# Patient Record
Sex: MALE | Race: ASIAN | NOT HISPANIC OR LATINO | URBAN - METROPOLITAN AREA
[De-identification: names, ages, dates, MRNs, and addresses within clinical notes are randomized per-mention and may not be internally consistent; named-entity substitution may affect disease eponyms.]

---

## 2019-12-17 ENCOUNTER — OFFICE VISIT (OUTPATIENT)
Dept: INTERNAL MEDICINE CLINIC | Facility: CLINIC | Age: 34
End: 2019-12-17
Payer: COMMERCIAL

## 2019-12-17 VITALS
HEART RATE: 100 BPM | SYSTOLIC BLOOD PRESSURE: 126 MMHG | RESPIRATION RATE: 16 BRPM | TEMPERATURE: 98.4 F | BODY MASS INDEX: 19.68 KG/M2 | OXYGEN SATURATION: 98 % | DIASTOLIC BLOOD PRESSURE: 86 MMHG | HEIGHT: 67 IN | WEIGHT: 125.4 LBS

## 2019-12-17 DIAGNOSIS — Z13.1 SCREENING FOR DIABETES MELLITUS: ICD-10-CM

## 2019-12-17 DIAGNOSIS — Z13.220 SCREENING FOR HYPERLIPIDEMIA: ICD-10-CM

## 2019-12-17 DIAGNOSIS — L50.9 URTICARIA: Primary | ICD-10-CM

## 2019-12-17 DIAGNOSIS — Z13.29 SCREENING FOR THYROID DISORDER: ICD-10-CM

## 2019-12-17 PROCEDURE — 3008F BODY MASS INDEX DOCD: CPT | Performed by: INTERNAL MEDICINE

## 2019-12-17 PROCEDURE — 99203 OFFICE O/P NEW LOW 30 MIN: CPT | Performed by: INTERNAL MEDICINE

## 2019-12-17 RX ORDER — TRIAMCINOLONE ACETONIDE 5 MG/G
CREAM TOPICAL 3 TIMES DAILY
Qty: 30 G | Refills: 1 | Status: SHIPPED | OUTPATIENT
Start: 2019-12-17

## 2019-12-17 NOTE — PROGRESS NOTES
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  -lesions present 1 month ago when he was in [place] working  -white linear streaks noted without puritis or tenderness however reports spreading without improvement  -has some mild eczema noted within webbing of hand  -will start on triamcinolone  -refer to dermatology    #Family History HLD  -noted in father  -will obtain lipids    #Health Maintenance  -routine labs and return to care 1 year  -flu vaccine 2019    No problem-specific Assessment & Plan notes found for this encounter  Diagnoses and all orders for this visit:    Urticaria  -     triamcinolone (KENALOG) 0 5 % cream; Apply topically 3 (three) times a day  -     CBC and differential  -     Lipid Panel with Direct LDL reflex  -     Hemoglobin A1C  -     TSH, 3rd generation with Free T4 reflex  -     Comprehensive metabolic panel  -     Ambulatory referral to Dermatology; Future    Screening for diabetes mellitus  -     CBC and differential  -     Lipid Panel with Direct LDL reflex  -     Hemoglobin A1C  -     TSH, 3rd generation with Free T4 reflex  -     Comprehensive metabolic panel    Screening for hyperlipidemia  -     CBC and differential  -     Lipid Panel with Direct LDL reflex  -     Hemoglobin A1C  -     TSH, 3rd generation with Free T4 reflex  -     Comprehensive metabolic panel    Screening for thyroid disorder  -     CBC and differential  -     Lipid Panel with Direct LDL reflex  -     Hemoglobin A1C  -     TSH, 3rd generation with Free T4 reflex  -     Comprehensive metabolic panel    Other orders  -     Cancel: HIV 1/2 AG-AB combo; Future            Current Outpatient Medications:     triamcinolone (KENALOG) 0 5 % cream, Apply topically 3 (three) times a day, Disp: 30 g, Rfl: 1    Subjective:      Patient ID: Luis Hillman is a 29 y o  male      HPI     Patient presents as a new patient visit  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  He states that last month he was working in [place] when he started noticing white linear lesions on his hands without any pruritus or tenderness  He states that over the past month that has been progressively getting worse  He has not done anything to alleviate this  He denies any recent changes in soaps or detergents  He reports that 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  On his hands we also noted that he had some regions of eczema between the webbing of his fingers and as result we will start him on a topical steroid  Informed him that his lesions may be likely related to aortic area however it is unclear  We will start him on the triamcinolone to see if this clears out and if it does not then he will need to see Dermatology  Patient denies any pets at home  He states that he goes hiking often however does not have any other hobbies  Patient denies any past medical is history or surgeries or hospitalizations  He is not currently on any medications and does not have any allergies  He denies any smoking, alcohol, drug use  Family history is positive for father with hyperlipidemia  Patient is up-to-date on his flu vaccine  We will obtain routine labs and he will return to care in 1 year    The following portions of the patient's history were reviewed and updated as appropriate: allergies, current medications, past family history, past medical history, past social history, past surgical history and problem list     Review of Systems   Constitutional: Negative for activity change, appetite change, fatigue and fever  HENT: Negative for congestion, ear pain, hearing loss, sore throat and tinnitus  Eyes: Negative for photophobia, pain and visual disturbance  Respiratory: Negative for cough, shortness of breath and wheezing      Cardiovascular: Negative for chest pain and leg swelling  Gastrointestinal: Negative for abdominal distention, abdominal pain, constipation, nausea and vomiting  Genitourinary: Negative for difficulty urinating, frequency and hematuria  Musculoskeletal: Negative for arthralgias, back pain, gait problem, joint swelling, myalgias, neck pain and neck stiffness  Skin: Positive for rash (linear white lesions on hands and arms)  Negative for color change, pallor and wound  Neurological: Negative for dizziness, tremors, facial asymmetry, weakness, light-headedness, numbness and headaches  Hematological: Does not bruise/bleed easily  Objective:      /86   Pulse 100   Temp 98 4 °F (36 9 °C) (Oral)   Resp 16   Wt 56 9 kg (125 lb 6 4 oz)   SpO2 98%          Physical Exam   Constitutional: He is oriented to person, place, and time  He appears well-developed and well-nourished  HENT:   Head: Normocephalic and atraumatic  Right Ear: External ear normal    Left Ear: External ear normal    Nose: Nose normal    Mouth/Throat: Oropharynx is clear and moist    Eyes: Pupils are equal, round, and reactive to light  Conjunctivae and EOM are normal  Right eye exhibits no discharge  Left eye exhibits no discharge  No scleral icterus  Neck: Normal range of motion  Neck supple  No JVD present  No thyromegaly present  Cardiovascular: Normal rate, regular rhythm, normal heart sounds and intact distal pulses  No murmur heard  Pulmonary/Chest: Effort normal and breath sounds normal  No stridor  No respiratory distress  He has no wheezes  He has no rales  He exhibits no tenderness  Abdominal: Soft  Bowel sounds are normal  He exhibits no distension and no mass  There is no tenderness  There is no rebound and no guarding  Musculoskeletal: Normal range of motion  He exhibits no edema, tenderness or deformity  Lymphadenopathy:     He has no cervical adenopathy  Neurological: He is alert and oriented to person, place, and time   He has normal reflexes  He displays normal reflexes  No sensory deficit  He exhibits normal muscle tone  Coordination normal    Skin: Skin is warm and dry  Rash (linear white lesions on hands, forearms without pruritis nor tenderness) noted  No erythema  No pallor  Vitals reviewed

## 2021-08-23 ENCOUNTER — TELEPHONE (OUTPATIENT)
Dept: INTERNAL MEDICINE CLINIC | Facility: CLINIC | Age: 36
End: 2021-08-23

## 2021-08-23 NOTE — TELEPHONE ENCOUNTER
Called patient he has switched pcp     He goes to Dr Jose M Trinh at [facility]      Please remove from panel    Thank you

## 2021-10-05 NOTE — TELEPHONE ENCOUNTER
10/05/21 11:57 AM     Thank you for your request  Your request has been received, reviewed, and the patient chart updated  The PCP has successfully been removed with a patient attribution note  This message will now be completed      Thank you  Rosemarie Crowe